# Patient Record
(demographics unavailable — no encounter records)

---

## 2025-03-06 NOTE — DISCUSSION/SUMMARY
[FreeTextEntry1] : 72-year-old P3 annual GYN, vaginal dryness, vaginal odor,  vaginal cyst Pap HPV Vaginal culture Mammogram report from regional DEXA Vaginal moisturizers lubricants Bouchra Perla discussed BRCA testing discussed

## 2025-03-06 NOTE — PHYSICAL EXAM
[Chaperone Declined] : Patient declined chaperone [Appropriately responsive] : appropriately responsive [Alert] : alert [No Acute Distress] : no acute distress [No Lymphadenopathy] : no lymphadenopathy [Soft] : soft [Non-tender] : non-tender [Non-distended] : non-distended [No HSM] : No HSM [No Lesions] : no lesions [No Mass] : no mass [Oriented x3] : oriented x3 [Examination Of The Breasts] : a normal appearance [No Discharge] : no discharge [No Masses] : no breast masses were palpable [Labia Majora] : normal [Labia Minora] : normal [Atrophy] : atrophy [Normal] : normal [Uterine Adnexae] : normal [Discharge] : discharge [FreeTextEntry1] : Left vaginal cyst about 2 cm most likely vascular at the entry to the vagina, was excised in the past but grew back for patient [FreeTextEntry5] : Some vaginal discharge

## 2025-03-06 NOTE — HISTORY OF PRESENT ILLNESS
[Patient reported mammogram was normal] : Patient reported mammogram was normal [Patient reported colonoscopy was normal] : Patient reported colonoscopy was normal [Y] : Positive pregnancy history [TextBox_4] : GYNHX No history of fibroids, cysts, or STDs Last Pap 2020 normal [Mammogramdate] : 2024 [PapSmeardate] : 2020 [ColonoscopyDate] : 2024 [PGHxTotal] : 3 [[facility] : [facility] [PGHxAbortions] : 1 [[facility] [FreeTextEntry1] : c-sections , one set twins, TOP, SAB,  miranda

## 2025-03-06 NOTE — HISTORY OF PRESENT ILLNESS
[Patient reported mammogram was normal] : Patient reported mammogram was normal [Patient reported colonoscopy was normal] : Patient reported colonoscopy was normal [Y] : Positive pregnancy history [TextBox_4] : GYNHX No history of fibroids, cysts, or STDs Last Pap 2020 normal [Mammogramdate] : 2024 [PapSmeardate] : 2020 [ColonoscopyDate] : 2024 [PGHxTotal] : 3 [[facility]] : 2 [PGHxAbortions] : 1 [[facility]] : [facility] [FreeTextEntry1] : c-sections , one set twins, TOP, SAB,  miranda

## 2025-04-14 NOTE — HISTORY OF PRESENT ILLNESS
[FreeTextEntry1] : 73 yo Patient is here for consultation about HPV , concerned re cancer she c/o recurrent uti's that became worse lately

## 2025-04-14 NOTE — HISTORY OF PRESENT ILLNESS
[FreeTextEntry1] : 71 yo Patient is here for consultation about HPV , concerned re cancer she c/o recurrent uti's that became worse lately

## 2025-04-14 NOTE — DISCUSSION/SUMMARY
[FreeTextEntry1] : 73 yo p3 , recurrent uti, HPV HR qns re hpv virus and its effects d/w pt in details pap in 6 months cranberry pills, d mannose estrogen vaginal cream

## 2025-04-14 NOTE — DISCUSSION/SUMMARY
[FreeTextEntry1] : 71 yo p3 , recurrent uti, HPV HR qns re hpv virus and its effects d/w pt in details pap in 6 months cranberry pills, d mannose estrogen vaginal cream  Detail Level: Detailed